# Patient Record
Sex: FEMALE | Race: WHITE | NOT HISPANIC OR LATINO | Employment: UNEMPLOYED | URBAN - METROPOLITAN AREA
[De-identification: names, ages, dates, MRNs, and addresses within clinical notes are randomized per-mention and may not be internally consistent; named-entity substitution may affect disease eponyms.]

---
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
## 2025-06-01 ENCOUNTER — OFFICE VISIT (OUTPATIENT)
Dept: URGENT CARE | Facility: CLINIC | Age: 20
End: 2025-06-01
Payer: COMMERCIAL

## 2025-06-01 VITALS
BODY MASS INDEX: 19.88 KG/M2 | SYSTOLIC BLOOD PRESSURE: 127 MMHG | HEART RATE: 94 BPM | DIASTOLIC BLOOD PRESSURE: 70 MMHG | WEIGHT: 108 LBS | OXYGEN SATURATION: 98 % | HEIGHT: 62 IN | TEMPERATURE: 98 F | RESPIRATION RATE: 18 BRPM

## 2025-06-01 DIAGNOSIS — J02.9 SORE THROAT: ICD-10-CM

## 2025-06-01 DIAGNOSIS — J02.0 STREP THROAT: Primary | ICD-10-CM

## 2025-06-01 LAB — S PYO AG THROAT QL: POSITIVE

## 2025-06-01 PROCEDURE — 87880 STREP A ASSAY W/OPTIC: CPT | Performed by: PHYSICIAN ASSISTANT

## 2025-06-01 PROCEDURE — 99213 OFFICE O/P EST LOW 20 MIN: CPT | Performed by: PHYSICIAN ASSISTANT

## 2025-06-01 RX ORDER — CEFDINIR 300 MG/1
300 CAPSULE ORAL EVERY 12 HOURS SCHEDULED
Qty: 10 CAPSULE | Refills: 0 | Status: SHIPPED | OUTPATIENT
Start: 2025-06-01 | End: 2025-06-06

## 2025-06-01 NOTE — PATIENT INSTRUCTIONS
Acute Pharyngitis:   -Rapid strep was positive again, will send out throat culture to assess if this is also group B strep like her BF has. We discussed possible colonization, she should get another culture done when she feels asymptomatic. I would also like her to see ENT. There is no sign of oropharyngeal abscess. Patient is well appearing.   -We discussed to use a UV light ultrasonic cleaner for her retainer and to not use the retainer until she has been on antibiotics for 72 hours.   -Will send in Cefdinir PO BID x 5 days as there are exudates, fever, lymphadenopathy and recent strep exposure. Take with food and a probiotic.   -Warm salt water gargles and tea with honey may be soothing  -Stay very well hydrated and rested  -Advil or Tylenol for pain or fever  -Eat soft foods   -Cepacol throat lozenges for the pain   -Honey lozenges for the hoarseness  -Change your toothbrush 48 hours after beginning your antibiotic   -Run a humidifier by your bed. Take steam showers.   -If your sx worsen see your PCP immediately. Red flag signs and ED precautions discussed.      patient instructed to follow up with her PCP office for re-check as well as further evaluation and treatment of her symptoms including testing for mononucleosis if indicated.

## 2025-06-01 NOTE — PROGRESS NOTES
[facility]        NAME: Lynette Hathaway is a 20 y.o. female  : 2005    MRN: [id number]  DATE: 2025  TIME: 11:59 AM    Assessment and Plan   Strep throat [J02.0]  1. Strep throat  Ambulatory Referral to Otolaryngology    Upper Respiratory Culture    Upper Respiratory Culture      2. Sore throat  POCT rapid ANTIGEN strepA    Upper Respiratory Culture    Upper Respiratory Culture            Patient Instructions   Acute Pharyngitis:   -Rapid strep was positive again, will send out throat culture to assess if this is also group B strep like her BF has. We discussed possible colonization, she should get another culture done when she feels asymptomatic. I would also like her to see ENT. There is no sign of oropharyngeal abscess. Patient is well appearing.   -We discussed to use a UV light ultrasonic cleaner for her retainer and to not use the retainer until she has been on antibiotics for 72 hours.   -Will send in Cefdinir PO BID x 5 days as there are exudates, fever, lymphadenopathy and recent strep exposure. Take with food and a probiotic.   -Warm salt water gargles and tea with honey may be soothing  -Stay very well hydrated and rested  -Advil or Tylenol for pain or fever  -Eat soft foods   -Cepacol throat lozenges for the pain   -Honey lozenges for the hoarseness  -Change your toothbrush 48 hours after beginning your antibiotic   -Run a humidifier by your bed. Take steam showers.   -If your sx worsen see your PCP immediately. Red flag signs and ED precautions discussed.      patient instructed to follow up with her PCP office for re-check as well as further evaluation and treatment of her symptoms including testing for mononucleosis if indicated.         Follow up with PCP in 3-5 days.  Proceed to  ER if symptoms worsen.    If tests have been performed at [facility], our office will contact you with results if changes need to be made to the care plan discussed with you at the visit.  You can  review your full results on [facility].    Chief Complaint     Chief Complaint   Patient presents with    Sore Throat     Sore throat has had 3 episodes of treated strep last  dose Augmentin was 1 week ago          History of Present Illness       The patient presents today for sore throat x 24 hours.The patient states that she completed Augmentin for her third round of strep one week ago. She did feel improved until yesterday. Her first episode of strep was March and she was treated with amoxicillin, she then again treated for strep in April with amoxicillin. She states that she throws out her tooth brush after taking the antibiotics. Her boyfriend tested positive for group B strep. She is concerned that her retainer she wears to bed is re-infecting her or its coming from her BF.     No fever, chills, body aches. No dyspnea, wheezing, chest tightness, cp, palpitations. No dizziness or weakness. No nausea, vomiting, diarrhea, constipation, abdominal pain.   No stridor or drooling. No neck swelling. No rash. No sweats or diaphoresis. No muffled voice.  Good PO intake. No loss of taste or smell. No lower extremity edema. No hx of asthma or smoking. No OTC measures.            Review of Systems   Review of Systems   Constitutional:  Positive for fatigue. Negative for activity change, appetite change, chills, diaphoresis and fever.   HENT:  Positive for sore throat. Negative for congestion, ear discharge, ear pain, facial swelling, hearing loss, postnasal drip, rhinorrhea, sinus pressure, sinus pain, tinnitus, trouble swallowing and voice change.    Eyes:  Negative for visual disturbance.   Respiratory:  Negative for apnea, cough, chest tightness, shortness of breath, wheezing and stridor.    Cardiovascular:  Negative for chest pain, palpitations and leg swelling.   Gastrointestinal:  Negative for abdominal distention and abdominal pain.   Genitourinary:  Negative for decreased urine volume.   Musculoskeletal:   "Negative for arthralgias, joint swelling, myalgias, neck pain and neck stiffness.   Skin:  Negative for rash.   Allergic/Immunologic: Negative for immunocompromised state.   Neurological:  Negative for dizziness, weakness, light-headedness, numbness and headaches.   Hematological:  Negative for adenopathy.         Current Medications     Current Medications[1]    Current Allergies     Allergies as of 06/01/2025 - Reviewed 06/01/2025   Allergen Reaction Noted    Nuts - food allergy Rash and Anaphylaxis 10/18/2021    Other Other (See Comments) 09/12/2024    Peanut (diagnostic) - food allergy  06/20/2014    Peanut oil - food allergy Rash 10/18/2021            The following portions of the patient's history were reviewed and updated as appropriate: allergies, current medications, past family history, past medical history, past social history, past surgical history and problem list.     Past Medical History[2]    Past Surgical History[3]    Family History[4]      Medications have been verified.        Objective   /70   Pulse 94   Temp 98 °F (36.7 °C)   Resp 18   Ht 5' 2\" (1.575 m)   Wt 49 kg (108 lb)   LMP 05/29/2025   SpO2 98%   BMI 19.75 kg/m²   Patient's last menstrual period was 05/29/2025.       Physical Exam     Physical Exam  Vitals and nursing note reviewed.   Constitutional:       General: She is not in acute distress.     Appearance: She is well-developed. She is not ill-appearing, toxic-appearing or diaphoretic.   HENT:      Head: Normocephalic and atraumatic.      Right Ear: Hearing, tympanic membrane, ear canal and external ear normal.      Left Ear: Hearing, tympanic membrane, ear canal and external ear normal.      Nose: Nose normal. No mucosal edema or rhinorrhea.      Right Sinus: No maxillary sinus tenderness or frontal sinus tenderness.      Left Sinus: No maxillary sinus tenderness or frontal sinus tenderness.      Mouth/Throat:      Pharynx: Uvula midline. Pharyngeal swelling and " posterior oropharyngeal erythema present. No oropharyngeal exudate, uvula swelling or postnasal drip.      Tonsils: Tonsillar exudate present. No tonsillar abscesses. 2+ on the right. 2+ on the left.     Cardiovascular:      Rate and Rhythm: Normal rate and regular rhythm.      Heart sounds: S1 normal and S2 normal. Heart sounds not distant. No murmur heard.     No friction rub. No gallop. No S3 or S4 sounds.   Pulmonary:      Effort: No tachypnea, bradypnea, accessory muscle usage or respiratory distress.      Breath sounds: No decreased breath sounds, wheezing, rhonchi or rales.     Musculoskeletal:      Cervical back: Normal range of motion and neck supple.   Lymphadenopathy:      Cervical: Cervical adenopathy present.      Right cervical: Superficial cervical adenopathy present.      Left cervical: Superficial cervical adenopathy present.     Neurological:      Mental Status: She is alert and oriented to person, place, and time.     Psychiatric:         Behavior: Behavior normal.                        [1]   Current Outpatient Medications:     EPINEPHrine (Auvi-Q) 0.3 mg/0.3 mL SOAJ, Inject 0.3 mL (0.3 mg total) into a muscle once for 1 dose, Disp: 2 each, Rfl: 2    fluticasone (FLONASE) 50 mcg/act nasal spray, 1 spray into each nostril daily (Patient not taking: Reported on 6/1/2025), Disp: 10 mL, Rfl: 0  [2]   Past Medical History:  Diagnosis Date    COVID-19 virus infection 09/16/2021    Exposure to COVID-19 virus 07/23/2022    Fever 09/14/2021    Food intolerance     Generalized headache 09/14/2021    Infection due to 2019 novel coronavirus 09/16/2021    PCR + 9-14-21 low grade fever , cough, got the covid vaccine 9-11-21    Myalgia 09/14/2021    Scoliosis     Sore throat 04/21/2015    Upper respiratory tract infection 09/22/2015   [3]   Past Surgical History:  Procedure Laterality Date    SPINAL FUSION     [4]   Family History  Problem Relation Name Age of Onset    Scoliosis Mother      Hyperlipidemia  Mother      No Known Problems Father      Asthma Brother          childhood    No Known Problems Brother      Scoliosis Maternal Aunt

## 2025-06-06 LAB
BACTERIA SPEC RESP CULT: ABNORMAL
Lab: ABNORMAL

## 2025-06-13 ENCOUNTER — OFFICE VISIT (OUTPATIENT)
Dept: URGENT CARE | Facility: CLINIC | Age: 20
End: 2025-06-13
Payer: COMMERCIAL

## 2025-06-13 VITALS
BODY MASS INDEX: 20.06 KG/M2 | WEIGHT: 109 LBS | HEART RATE: 90 BPM | HEIGHT: 62 IN | RESPIRATION RATE: 18 BRPM | TEMPERATURE: 98 F | OXYGEN SATURATION: 100 %

## 2025-06-13 DIAGNOSIS — J02.0 STREP PHARYNGITIS: Primary | ICD-10-CM

## 2025-06-13 LAB — S PYO AG THROAT QL: POSITIVE

## 2025-06-13 PROCEDURE — 87880 STREP A ASSAY W/OPTIC: CPT

## 2025-06-13 PROCEDURE — 99213 OFFICE O/P EST LOW 20 MIN: CPT

## 2025-06-13 RX ORDER — CLINDAMYCIN HYDROCHLORIDE 300 MG/1
300 CAPSULE ORAL 3 TIMES DAILY
Qty: 42 CAPSULE | Refills: 0 | Status: SHIPPED | OUTPATIENT
Start: 2025-06-13 | End: 2025-06-27

## 2025-06-13 NOTE — PROGRESS NOTES
[facility]        NAME: Lynette Hathaway is a 20 y.o. female  : 2005    MRN: [id number]  DATE: 2025  TIME: 6:56 PM    Assessment and Plan   Strep pharyngitis [J02.0]  1. Strep pharyngitis  POCT rapid ANTIGEN strepA    clindamycin (CLEOCIN) 300 MG capsule        Rapid strep positive in the office. Will treat with clindamycin x 14 days as this is what ENT recommended at her last visit.     Patient Instructions     Take antibiotics as prescribed  Replace toothbrush after 2-3 days of treatment  Fluids and rest  Salt water gargles and chloraseptic spray  Warm tea with honey  Wash hands frequently  Don't share drinks  Tylenol/Ibuprofen for pain/fever     Follow up with PCP in 3-5 days.  Proceed to the ER with worsening symptoms.       If tests are performed, our office will contact you with results only if changes need to made to the care plan discussed with you at the visit. [facility]    Chief Complaint     Chief Complaint   Patient presents with    Sore Throat     Sore throat again          History of Present Illness       Has had 4 episodes of strep in the past few months, most recently diagnosed . Saw ENT who plans to remove tonsils if she has another infection. Sore throat started again yesterday.     Sore Throat   This is a recurrent problem. The current episode started yesterday. The problem has been gradually worsening. Neither side of throat is experiencing more pain than the other. There has been no fever. The pain is moderate. Pertinent negatives include no abdominal pain, congestion, coughing, headaches, shortness of breath, trouble swallowing or vomiting. She has tried nothing for the symptoms.       Review of Systems   Review of Systems   Constitutional:  Negative for chills and fever.   HENT:  Positive for sore throat. Negative for congestion, postnasal drip, rhinorrhea, sinus pressure and trouble swallowing.    Respiratory:   "Negative for cough, chest tightness and shortness of breath.    Cardiovascular:  Negative for chest pain and palpitations.   Gastrointestinal:  Negative for abdominal pain, nausea and vomiting.   Genitourinary:  Negative for difficulty urinating.   Musculoskeletal:  Negative for myalgias.   Neurological:  Negative for dizziness and headaches.         Current Medications     Current Medications[1]    Current Allergies     Allergies as of 06/13/2025 - Reviewed 06/13/2025   Allergen Reaction Noted    Nuts - food allergy Rash and Anaphylaxis 10/18/2021    Other Other (See Comments) 09/12/2024    Peanut (diagnostic) - food allergy  06/20/2014    Peanut oil - food allergy Rash 10/18/2021            The following portions of the patient's history were reviewed and updated as appropriate: allergies, current medications, past family history, past medical history, past social history, past surgical history and problem list.     Past Medical History[2]    Past Surgical History[3]    Family History[4]      Medications have been verified.        Objective   Pulse 90   Temp 98 °F (36.7 °C)   Resp 18   Ht 5' 2\" (1.575 m)   Wt 49.4 kg (109 lb)   LMP 05/29/2025   SpO2 100%   BMI 19.94 kg/m²        Physical Exam     Physical Exam  Constitutional:       General: She is not in acute distress.     Appearance: She is not ill-appearing.   HENT:      Nose: Nose normal.      Mouth/Throat:      Mouth: Mucous membranes are moist.      Pharynx: Oropharynx is clear. Uvula midline. No posterior oropharyngeal erythema.      Tonsils: No tonsillar exudate or tonsillar abscesses. 2+ on the right. 2+ on the left.     Eyes:      Pupils: Pupils are equal, round, and reactive to light.       Cardiovascular:      Rate and Rhythm: Normal rate and regular rhythm.      Pulses: Normal pulses.      Heart sounds: Normal heart sounds. No murmur heard.     No gallop.   Pulmonary:      Effort: Pulmonary effort is normal. No respiratory distress.      " Breath sounds: Normal breath sounds. No wheezing.   Abdominal:      General: Abdomen is flat. Bowel sounds are normal. There is no distension.      Palpations: Abdomen is soft.      Tenderness: There is no abdominal tenderness.     Musculoskeletal:         General: Normal range of motion.      Cervical back: Normal range of motion.     Skin:     General: Skin is warm and dry.      Capillary Refill: Capillary refill takes less than 2 seconds.     Neurological:      Mental Status: She is alert and oriented to person, place, and time.                        [1]   Current Outpatient Medications:     clindamycin (CLEOCIN) 300 MG capsule, Take 1 capsule (300 mg total) by mouth 3 (three) times a day for 14 days, Disp: 42 capsule, Rfl: 0    EPINEPHrine (Auvi-Q) 0.3 mg/0.3 mL SOAJ, Inject 0.3 mL (0.3 mg total) into a muscle once for 1 dose, Disp: 2 each, Rfl: 2    fluticasone (FLONASE) 50 mcg/act nasal spray, 1 spray into each nostril daily (Patient not taking: Reported on 9/12/2024), Disp: 10 mL, Rfl: 0  [2]   Past Medical History:  Diagnosis Date    COVID-19 virus infection 09/16/2021    Exposure to COVID-19 virus 07/23/2022    Fever 09/14/2021    Food intolerance     Generalized headache 09/14/2021    Infection due to 2019 novel coronavirus 09/16/2021    PCR + 9-14-21 low grade fever , cough, got the covid vaccine 9-11-21    Myalgia 09/14/2021    Scoliosis     Sore throat 04/21/2015    Upper respiratory tract infection 09/22/2015   [3]   Past Surgical History:  Procedure Laterality Date    SPINAL FUSION     [4]   Family History  Problem Relation Name Age of Onset    Scoliosis Mother      Hyperlipidemia Mother      No Known Problems Father      Asthma Brother          childhood    No Known Problems Brother      Scoliosis Maternal Aunt

## 2025-06-13 NOTE — PATIENT INSTRUCTIONS
Take antibiotics as prescribed  Replace toothbrush after 2-3 days of treatment  Fluids and rest  Salt water gargles and chloraseptic spray  Warm tea with honey  Wash hands frequently  Don't share drinks  Tylenol/Ibuprofen for pain/fever     Follow up with PCP in 3-5 days.  Proceed to the ER with worsening symptoms.

## 2025-06-16 ENCOUNTER — TELEPHONE (OUTPATIENT)
Age: 20
End: 2025-06-16

## 2025-06-16 NOTE — TELEPHONE ENCOUNTER
Patient called to schedule appt. Provided Dr. Levine's number at [facility].  She has recurrent strep throat and is looking to get a second opinion.